# Patient Record
Sex: FEMALE | Race: WHITE | NOT HISPANIC OR LATINO | Employment: FULL TIME | URBAN - NONMETROPOLITAN AREA
[De-identification: names, ages, dates, MRNs, and addresses within clinical notes are randomized per-mention and may not be internally consistent; named-entity substitution may affect disease eponyms.]

---

## 2024-03-27 NOTE — PROGRESS NOTES
"Faye García \"Faye Linda\" is a 58 y.o. female who presents for Establish Care (she had a septal infarct, but not sure when it happened, Fhx of heart disease; she exercises regularly and eats well; funny feeling in left side under ribs and was sent for breath test due to hx of h. pylori; after breath test the pain got worse and spread to right side; has had multiple testing done to r/o gall bladder issues which was normal, told IBS; saw [facility] for scope; has daily pain and unable to eat or sleep; she's inflamed all the time; bit by spider 6 years ago-santino brown karla) [place]; Face gets very red when she eats sometimes    Overall not feeling well. Started when she was bitten by a spider about 6 years ago, has not felt well since then.    Waist pain: Started on left side, by the bottom of the ribs. Started in November. Was previously diagnosed with h pylori and she rechecked again and after that she had severe pain on both sides and face turned red/hot. Went to the ER which testing only revealed elevated liver function tests. Went to GI He told her it was her gall bladder but then had HIDA that came back negative. She was told it is likely IBS. Had scope with a different doctor who told her she had acute gastritis, a small ulcer, diverticulosis and abdominal migraines. She had a couple polyps removed. 2 mo ago she was put on amitriptyline. She states that it made her sleepy and made it hard to function. She stayed on the pantoprazole. She is dealing with a lot of bloating and gas. She has been bitten bu a tick a couple of times. She has one on right ankle and one on left ankle about 2 weeks ago that are not healing. She states that now when she eats food sometimes her face will turn red and swell. Greasy foods are a trigger. She does have an allergy to elastic for instance outlining the sports bra when she exrecises.     Spider bite: She was camping in " "[place], she had a bite on her right flank. Had incision and biopsy and the rash from the bite spread very intensely after that. She ended up seeing a dermatologist who told her it was likely a brown recluse spider bite. She had issues with hand stiffness and swelling.     H/o septal infarct: This summer she was at her cardiologist, Dr. Crenshaw, in [place]. She was getting her BP refilled. At the visit she saw evidence of a septal infarct which she was never told about. She asked Dr. Crenshaw about. Later saw a different cardiologist at the [facility] and his EKG showed the same thing. Never had stress testing done.     Perimenopausal sx: She was having hot flashes, these got better. She has been on estrogen and progesterone patches, symptoms got worse after starting.     Mom is Faye Norton.     Review of systems completed and unremarkable other than what is documented in HPI.     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, integrated machine and horsepower equipment  Medical history: HTN  Medications: estradiol, lisinopril, pantoprazole, progesterone  SurgHx: left breast cyst removal at 10, right inguinal hernia, trigger finger of right thumb  Fhx: dad has strong history of heart disease, multiple, mom's side with colon cancer, paternal grandfather had lung ca in 80's, paternal grandfather had ALS  Allergies: lidocaine, acetazolamide, sulfa, sudafed, latex, monosodium glutamate, soy    Objective   BP (!) 141/95 (BP Location: Left arm, Patient Position: Sitting, BP Cuff Size: Adult)   Pulse 67   Ht 1.651 m (5' 5\")   Wt 71.2 kg (157 lb)   BMI 26.13 kg/m²     Gen: No acute distress, alert and oriented x3, pleasant   HEENT: moist mucous membranes, b/l external auditory canals are clear of debris, TMs within normal limits, no oropharyngeal lesions, eomi, perrla   Neck: thyroid within normal limits, no lymphadenopathy   CV: RRR, normal " S1/S2, no murmur   Resp: Clear to auscultation bilaterally, no wheezes or rhonchi appreciated  Abd: soft, nontender, non-distended, no guarding/rigidity, bowel sounds present  Extr: no edema, no calf tenderness  Derm: Skin is warm and dry, no rashes appreciated, there is onychorrhexis onycholysis noted, she also has 2 skin lesions, one on each ankle, right ankle with small eschar with induration and erythema noted, no fluctuance, she also has skin changes above right eyebrow, erythema, scaling  Psych: mood is good, affect is congruent, good hygiene, normal speech and eye contact  Neuro: cranial nerves grossly intact, normal gait  MSK: There is swelling in multiple joints of the hands/fingers    Assessment/Plan     #Waist pain:   Dealing with bloating and gas as well  There are some certain dietary triggers  Had bidirectional scope and imaging and h pylori testing  EGD showed gastritis  She has had HIDA scan (negative)  Tried low FODMAP diet without relief  Check labs  Currently on PPI  Famotidine and amitriptyline were not helpful    #Spider bite:   Check labs  I suspect she had blossoming of autoimmune symptoms as a result of that insult    #Abnormal EKG  Check stress test    #HTN:  Controlled on lisinopril    #Joint swelling, multiple sites  Check xrays hands  Check labs  Concern for angioedema, labs ordered    #H/o septal infarct:  Has seen several cardiologists  Ordering stress test    #Perimenopausal sx:   On estrogen and progesterone for 3 mo by gyn

## 2024-04-01 ENCOUNTER — HOSPITAL ENCOUNTER (OUTPATIENT)
Dept: RADIOLOGY | Facility: HOSPITAL | Age: 59
Discharge: HOME | End: 2024-04-01
Payer: COMMERCIAL

## 2024-04-01 ENCOUNTER — LAB (OUTPATIENT)
Dept: LAB | Facility: LAB | Age: 59
End: 2024-04-01
Payer: COMMERCIAL

## 2024-04-01 ENCOUNTER — OFFICE VISIT (OUTPATIENT)
Dept: PRIMARY CARE | Facility: CLINIC | Age: 59
End: 2024-04-01
Payer: COMMERCIAL

## 2024-04-01 VITALS
SYSTOLIC BLOOD PRESSURE: 141 MMHG | HEIGHT: 65 IN | BODY MASS INDEX: 26.16 KG/M2 | WEIGHT: 157 LBS | DIASTOLIC BLOOD PRESSURE: 95 MMHG | HEART RATE: 67 BPM

## 2024-04-01 DIAGNOSIS — M25.50 PAIN IN JOINT INVOLVING MULTIPLE SITES: ICD-10-CM

## 2024-04-01 DIAGNOSIS — R10.9 FLANK PAIN: ICD-10-CM

## 2024-04-01 DIAGNOSIS — L60.8 CHANGE IN NAIL APPEARANCE: ICD-10-CM

## 2024-04-01 DIAGNOSIS — R21 RASH: Primary | ICD-10-CM

## 2024-04-01 DIAGNOSIS — R94.31 ABNORMAL EKG: ICD-10-CM

## 2024-04-01 DIAGNOSIS — R21 RASH: ICD-10-CM

## 2024-04-01 PROBLEM — G89.29 CHRONIC NECK PAIN: Status: ACTIVE | Noted: 2023-07-13

## 2024-04-01 PROBLEM — I10 ESSENTIAL HYPERTENSION: Status: ACTIVE | Noted: 2017-02-15

## 2024-04-01 PROBLEM — M54.2 CHRONIC NECK PAIN: Status: ACTIVE | Noted: 2023-07-13

## 2024-04-01 PROBLEM — M19.019 DJD OF AC (ACROMIOCLAVICULAR) JOINT: Status: ACTIVE | Noted: 2017-11-20

## 2024-04-01 PROBLEM — G43.109 MIGRAINE WITH AURA AND WITHOUT STATUS MIGRAINOSUS, NOT INTRACTABLE: Status: ACTIVE | Noted: 2017-12-18

## 2024-04-01 PROBLEM — D25.0 SUBMUCOUS LEIOMYOMA OF UTERUS: Status: ACTIVE | Noted: 2018-01-29

## 2024-04-01 PROBLEM — E78.2 MIXED HYPERLIPIDEMIA: Status: ACTIVE | Noted: 2023-07-13

## 2024-04-01 LAB
ALBUMIN SERPL BCP-MCNC: 4.9 G/DL (ref 3.4–5)
ALP SERPL-CCNC: 91 U/L (ref 33–136)
ALT SERPL W P-5'-P-CCNC: 18 U/L (ref 7–45)
ANION GAP SERPL CALC-SCNC: 13 MMOL/L (ref 10–20)
AST SERPL W P-5'-P-CCNC: 20 U/L (ref 9–39)
BASOPHILS # BLD AUTO: 0.05 X10*3/UL (ref 0–0.1)
BASOPHILS NFR BLD AUTO: 0.8 %
BILIRUB SERPL-MCNC: 0.5 MG/DL (ref 0–1.2)
BUN SERPL-MCNC: 16 MG/DL (ref 6–23)
CALCIUM SERPL-MCNC: 10.3 MG/DL (ref 8.6–10.3)
CHLORIDE SERPL-SCNC: 101 MMOL/L (ref 98–107)
CO2 SERPL-SCNC: 27 MMOL/L (ref 21–32)
CREAT SERPL-MCNC: 0.93 MG/DL (ref 0.5–1.05)
CRP SERPL-MCNC: 0.82 MG/DL
EGFRCR SERPLBLD CKD-EPI 2021: 71 ML/MIN/1.73M*2
EOSINOPHIL # BLD AUTO: 0.08 X10*3/UL (ref 0–0.7)
EOSINOPHIL NFR BLD AUTO: 1.3 %
ERYTHROCYTE [DISTWIDTH] IN BLOOD BY AUTOMATED COUNT: 12.6 % (ref 11.5–14.5)
ERYTHROCYTE [SEDIMENTATION RATE] IN BLOOD BY WESTERGREN METHOD: 4 MM/H (ref 0–30)
GLUCOSE SERPL-MCNC: 95 MG/DL (ref 74–99)
HCT VFR BLD AUTO: 41.2 % (ref 36–46)
HGB BLD-MCNC: 13.7 G/DL (ref 12–16)
IMM GRANULOCYTES # BLD AUTO: 0.02 X10*3/UL (ref 0–0.7)
IMM GRANULOCYTES NFR BLD AUTO: 0.3 % (ref 0–0.9)
IRON SERPL-MCNC: 95 UG/DL (ref 35–150)
LYMPHOCYTES # BLD AUTO: 1.8 X10*3/UL (ref 1.2–4.8)
LYMPHOCYTES NFR BLD AUTO: 29.9 %
MCH RBC QN AUTO: 29.7 PG (ref 26–34)
MCHC RBC AUTO-ENTMCNC: 33.3 G/DL (ref 32–36)
MCV RBC AUTO: 89 FL (ref 80–100)
MONOCYTES # BLD AUTO: 0.41 X10*3/UL (ref 0.1–1)
MONOCYTES NFR BLD AUTO: 6.8 %
NEUTROPHILS # BLD AUTO: 3.66 X10*3/UL (ref 1.2–7.7)
NEUTROPHILS NFR BLD AUTO: 60.9 %
NRBC BLD-RTO: 0 /100 WBCS (ref 0–0)
PLATELET # BLD AUTO: 273 X10*3/UL (ref 150–450)
POTASSIUM SERPL-SCNC: 4 MMOL/L (ref 3.5–5.3)
PROT SERPL-MCNC: 7.6 G/DL (ref 6.4–8.2)
RBC # BLD AUTO: 4.61 X10*6/UL (ref 4–5.2)
SODIUM SERPL-SCNC: 137 MMOL/L (ref 136–145)
TSH SERPL-ACNC: 1.53 MIU/L (ref 0.44–3.98)
URATE SERPL-MCNC: 6 MG/DL (ref 2.3–6.7)
WBC # BLD AUTO: 6 X10*3/UL (ref 4.4–11.3)

## 2024-04-01 PROCEDURE — 99204 OFFICE O/P NEW MOD 45 MIN: CPT | Performed by: FAMILY MEDICINE

## 2024-04-01 PROCEDURE — 82607 VITAMIN B-12: CPT

## 2024-04-01 PROCEDURE — 85652 RBC SED RATE AUTOMATED: CPT

## 2024-04-01 PROCEDURE — 86140 C-REACTIVE PROTEIN: CPT

## 2024-04-01 PROCEDURE — 86003 ALLG SPEC IGE CRUDE XTRC EA: CPT

## 2024-04-01 PROCEDURE — 86611 BARTONELLA ANTIBODY: CPT

## 2024-04-01 PROCEDURE — 1036F TOBACCO NON-USER: CPT | Performed by: FAMILY MEDICINE

## 2024-04-01 PROCEDURE — 83516 IMMUNOASSAY NONANTIBODY: CPT

## 2024-04-01 PROCEDURE — 73130 X-RAY EXAM OF HAND: CPT | Mod: 50

## 2024-04-01 PROCEDURE — 86376 MICROSOMAL ANTIBODY EACH: CPT

## 2024-04-01 PROCEDURE — 73130 X-RAY EXAM OF HAND: CPT | Mod: BILATERAL PROCEDURE | Performed by: RADIOLOGY

## 2024-04-01 PROCEDURE — 80053 COMPREHEN METABOLIC PANEL: CPT

## 2024-04-01 PROCEDURE — 86038 ANTINUCLEAR ANTIBODIES: CPT

## 2024-04-01 PROCEDURE — 86665 EPSTEIN-BARR CAPSID VCA: CPT

## 2024-04-01 PROCEDURE — 36415 COLL VENOUS BLD VENIPUNCTURE: CPT

## 2024-04-01 PROCEDURE — 85025 COMPLETE CBC W/AUTO DIFF WBC: CPT

## 2024-04-01 PROCEDURE — 86161 COMPLEMENT/FUNCTION ACTIVITY: CPT

## 2024-04-01 PROCEDURE — 87476 LYME DIS DNA AMP PROBE: CPT

## 2024-04-01 PROCEDURE — 86663 EPSTEIN-BARR ANTIBODY: CPT

## 2024-04-01 PROCEDURE — 3080F DIAST BP >= 90 MM HG: CPT | Performed by: FAMILY MEDICINE

## 2024-04-01 PROCEDURE — 81381 HLA I TYPING 1 ALLELE HR: CPT

## 2024-04-01 PROCEDURE — 83540 ASSAY OF IRON: CPT

## 2024-04-01 PROCEDURE — 84443 ASSAY THYROID STIM HORMONE: CPT

## 2024-04-01 PROCEDURE — 86664 EPSTEIN-BARR NUCLEAR ANTIGEN: CPT

## 2024-04-01 PROCEDURE — 86753 PROTOZOA ANTIBODY NOS: CPT

## 2024-04-01 PROCEDURE — 3077F SYST BP >= 140 MM HG: CPT | Performed by: FAMILY MEDICINE

## 2024-04-01 PROCEDURE — 84550 ASSAY OF BLOOD/URIC ACID: CPT

## 2024-04-01 PROCEDURE — 72072 X-RAY EXAM THORAC SPINE 3VWS: CPT

## 2024-04-01 RX ORDER — LISINOPRIL 10 MG/1
1 TABLET ORAL DAILY
COMMUNITY
Start: 2023-11-06

## 2024-04-01 RX ORDER — AMITRIPTYLINE HYDROCHLORIDE 10 MG/1
1 TABLET, FILM COATED ORAL NIGHTLY
COMMUNITY
Start: 2024-02-06 | End: 2024-04-01 | Stop reason: ALTCHOICE

## 2024-04-01 RX ORDER — PANTOPRAZOLE SODIUM 40 MG/1
40 TABLET, DELAYED RELEASE ORAL DAILY
COMMUNITY

## 2024-04-01 RX ORDER — ESTRADIOL 0.03 MG/D
FILM, EXTENDED RELEASE TRANSDERMAL
COMMUNITY

## 2024-04-01 RX ORDER — FAMOTIDINE 40 MG/1
40 TABLET, FILM COATED ORAL
COMMUNITY
Start: 2024-01-16 | End: 2024-04-01 | Stop reason: ALTCHOICE

## 2024-04-01 RX ORDER — PROGESTERONE 100 MG/1
CAPSULE ORAL
COMMUNITY
Start: 2024-01-10

## 2024-04-02 DIAGNOSIS — M46.24 OSTEOMYELITIS OF THORACIC SPINE (MULTI): Primary | ICD-10-CM

## 2024-04-02 LAB
ANA SER QL HEP2 SUBST: NEGATIVE
CLAM IGE QN: <0.1 KU/L
CODFISH IGE QN: <0.1 KU/L
CORN IGE QN: <0.1
EBV EA IGG SER QL: NEGATIVE
EBV NA AB SER QL: POSITIVE
EBV VCA IGG SER IA-ACNC: POSITIVE
EBV VCA IGM SER IA-ACNC: NEGATIVE
EGG WHITE IGE QN: <0.1 KU/L
GLIADIN PEPTIDE IGA SER IA-ACNC: 1.1 U/ML
MILK IGE QN: <0.1 KU/L
PEANUT IGE QN: <0.1 KU/L
SCALLOP IGE QN: <0.1 KU/L
SESAME SEED IGE QN: <0.1 KU/L
SHRIMP IGE QN: 0.13 KU/L
SOYBEAN IGE QN: <0.1 KU/L
THYROPEROXIDASE AB SERPL-ACNC: 29 IU/ML
TTG IGA SER IA-ACNC: <1 U/ML
VIT B12 SERPL-MCNC: 872 PG/ML (ref 211–911)
WALNUT IGE QN: <0.1 KU/L
WHEAT IGE QN: <0.1 KU/L

## 2024-04-03 ENCOUNTER — TELEPHONE (OUTPATIENT)
Dept: PRIMARY CARE | Facility: CLINIC | Age: 59
End: 2024-04-03
Payer: COMMERCIAL

## 2024-04-03 LAB
ANNOTATION COMMENT IMP: NORMAL
BROCCOLI IGE QN: <0.1 KU/L
GLIADIN PEPTIDE IGG SER IA-ACNC: <0.56 FLU (ref 0–4.99)
TTG IGG SER IA-ACNC: <0.82 FLU (ref 0–4.99)

## 2024-04-03 NOTE — TELEPHONE ENCOUNTER
Pt is asking if you can the MRI in as STAT so she can get it done sooner as she is going to a trade show and they can not get it scheduled until the day she leaves as it is now.

## 2024-04-04 ENCOUNTER — HOSPITAL ENCOUNTER (OUTPATIENT)
Dept: CARDIOLOGY | Facility: HOSPITAL | Age: 59
Discharge: HOME | End: 2024-04-04
Payer: COMMERCIAL

## 2024-04-04 DIAGNOSIS — Z00.00 ROUTINE GENERAL MEDICAL EXAMINATION AT A HEALTH CARE FACILITY: ICD-10-CM

## 2024-04-04 DIAGNOSIS — R94.31 ABNORMAL EKG: ICD-10-CM

## 2024-04-04 LAB
B BURGDOR DNA SPEC QL NAA+PROBE: NOT DETECTED
B HENSELAE IGG TITR SER IF: NORMAL {TITER}
B HENSELAE IGM TITR SER IF: NORMAL {TITER}
B MICROTI IGG TITR SER: NORMAL {TITER}
B MICROTI IGM TITR SER: NORMAL {TITER}
C1INH SERPL-MCNC: 43 MG/DL (ref 21–38)
HLAB27 TYPING: NEGATIVE
SPECIMEN SOURCE: NORMAL

## 2024-04-04 PROCEDURE — 93017 CV STRESS TEST TRACING ONLY: CPT

## 2024-04-04 PROCEDURE — 93016 CV STRESS TEST SUPVJ ONLY: CPT | Performed by: STUDENT IN AN ORGANIZED HEALTH CARE EDUCATION/TRAINING PROGRAM

## 2024-04-04 PROCEDURE — 93018 CV STRESS TEST I&R ONLY: CPT | Performed by: STUDENT IN AN ORGANIZED HEALTH CARE EDUCATION/TRAINING PROGRAM

## 2024-04-05 ENCOUNTER — LAB (OUTPATIENT)
Dept: LAB | Facility: LAB | Age: 59
End: 2024-04-05
Payer: COMMERCIAL

## 2024-04-05 ENCOUNTER — HOSPITAL ENCOUNTER (OUTPATIENT)
Dept: RADIOLOGY | Facility: HOSPITAL | Age: 59
Discharge: HOME | End: 2024-04-05
Payer: COMMERCIAL

## 2024-04-05 DIAGNOSIS — G06.2 EPIDURAL ABSCESS (HHS-HCC): Primary | ICD-10-CM

## 2024-04-05 DIAGNOSIS — M46.20 SPINAL ABSCESS (MULTI): Primary | ICD-10-CM

## 2024-04-05 DIAGNOSIS — M46.24 OSTEOMYELITIS OF THORACIC SPINE (MULTI): ICD-10-CM

## 2024-04-05 DIAGNOSIS — M46.20 SPINAL ABSCESS (MULTI): ICD-10-CM

## 2024-04-05 DIAGNOSIS — Z00.00 ROUTINE GENERAL MEDICAL EXAMINATION AT A HEALTH CARE FACILITY: ICD-10-CM

## 2024-04-05 LAB
ANION GAP SERPL CALC-SCNC: 10 MMOL/L (ref 10–20)
BUN SERPL-MCNC: 16 MG/DL (ref 6–23)
CALCIUM SERPL-MCNC: 10 MG/DL (ref 8.6–10.3)
CHLORIDE SERPL-SCNC: 104 MMOL/L (ref 98–107)
CO2 SERPL-SCNC: 29 MMOL/L (ref 21–32)
CREAT SERPL-MCNC: 0.87 MG/DL (ref 0.5–1.05)
EGFRCR SERPLBLD CKD-EPI 2021: 76 ML/MIN/1.73M*2
GLUCOSE SERPL-MCNC: 87 MG/DL (ref 74–99)
POTASSIUM SERPL-SCNC: 4.3 MMOL/L (ref 3.5–5.3)
SODIUM SERPL-SCNC: 139 MMOL/L (ref 136–145)

## 2024-04-05 PROCEDURE — A9575 INJ GADOTERATE MEGLUMI 0.1ML: HCPCS | Performed by: FAMILY MEDICINE

## 2024-04-05 PROCEDURE — 36415 COLL VENOUS BLD VENIPUNCTURE: CPT

## 2024-04-05 PROCEDURE — 87040 BLOOD CULTURE FOR BACTERIA: CPT

## 2024-04-05 PROCEDURE — 87075 CULTR BACTERIA EXCEPT BLOOD: CPT

## 2024-04-05 PROCEDURE — 80048 BASIC METABOLIC PNL TOTAL CA: CPT

## 2024-04-05 PROCEDURE — 2550000001 HC RX 255 CONTRASTS: Performed by: FAMILY MEDICINE

## 2024-04-05 PROCEDURE — 72157 MRI CHEST SPINE W/O & W/DYE: CPT | Performed by: RADIOLOGY

## 2024-04-05 PROCEDURE — 72157 MRI CHEST SPINE W/O & W/DYE: CPT

## 2024-04-05 RX ORDER — GADOTERATE MEGLUMINE 376.9 MG/ML
15 INJECTION INTRAVENOUS
Status: COMPLETED | OUTPATIENT
Start: 2024-04-05 | End: 2024-04-05

## 2024-04-05 RX ORDER — AMOXICILLIN AND CLAVULANATE POTASSIUM 875; 125 MG/1; MG/1
875 TABLET, FILM COATED ORAL 2 TIMES DAILY
Qty: 20 TABLET | Refills: 0 | Status: SHIPPED | OUTPATIENT
Start: 2024-04-05 | End: 2024-04-15

## 2024-04-05 RX ADMIN — GADOTERATE MEGLUMINE 15 ML: 376.9 INJECTION INTRAVENOUS at 09:48

## 2024-04-10 ENCOUNTER — TELEPHONE (OUTPATIENT)
Dept: HEMATOLOGY/ONCOLOGY | Facility: HOSPITAL | Age: 59
End: 2024-04-10
Payer: COMMERCIAL

## 2024-04-10 DIAGNOSIS — G06.2 EPIDURAL ABSCESS (HHS-HCC): ICD-10-CM

## 2024-04-10 LAB
BACTERIA BLD CULT: NORMAL
BACTERIA BLD CULT: NORMAL

## 2024-04-10 NOTE — TELEPHONE ENCOUNTER
Reached out to patient Faye this morning regarding orders we had received from Dr. MCGEE's office for PICC Line placement and to start Antibiotics. Patient stated she was unable to come in today for a PICC placement and would give me a call back once she saw if she could move her schedule around.     When I went to give the patient another call to follow up with our conversation this morning I had saw in her chart that she let her PCP know she was wanting a second opinion. I called the patient to follow up, patient stated I am going to put a hold on everything and get a second opinion first to see if I have the same response from a second provider. I let the patient know to give us a call if there is anything we could do to be of any help.     I gave Dr. MCGEE's office a call and let them know that we have received the orders and that the patient has decided she needs a second opinion before moving forward. Dr. MCGEE's office states they will get in contact with the patient.

## 2024-05-02 ENCOUNTER — TELEPHONE (OUTPATIENT)
Dept: PRIMARY CARE | Facility: CLINIC | Age: 59
End: 2024-05-02
Payer: COMMERCIAL

## 2024-05-02 NOTE — TELEPHONE ENCOUNTER
I did peer to peer and got authorization. Approval [id number]. Until Oct 2nd. Would you mind letting our radiology department know just in case?

## 2024-07-02 ENCOUNTER — APPOINTMENT (OUTPATIENT)
Dept: PRIMARY CARE | Facility: CLINIC | Age: 59
End: 2024-07-02
Payer: COMMERCIAL

## 2024-11-13 ENCOUNTER — APPOINTMENT (OUTPATIENT)
Dept: RADIOLOGY | Facility: HOSPITAL | Age: 59
End: 2024-11-13
Payer: COMMERCIAL